# Patient Record
Sex: FEMALE | Race: WHITE
[De-identification: names, ages, dates, MRNs, and addresses within clinical notes are randomized per-mention and may not be internally consistent; named-entity substitution may affect disease eponyms.]

---

## 2020-07-10 ENCOUNTER — HOSPITAL ENCOUNTER (EMERGENCY)
Dept: HOSPITAL 94 - ER | Age: 30
Discharge: HOME | End: 2020-07-10
Payer: SELF-PAY

## 2020-07-10 VITALS — BODY MASS INDEX: 35.76 KG/M2 | WEIGHT: 209.44 LBS | HEIGHT: 64 IN

## 2020-07-10 VITALS — DIASTOLIC BLOOD PRESSURE: 112 MMHG | SYSTOLIC BLOOD PRESSURE: 166 MMHG

## 2020-07-10 DIAGNOSIS — Z87.440: ICD-10-CM

## 2020-07-10 DIAGNOSIS — R11.0: ICD-10-CM

## 2020-07-10 DIAGNOSIS — R30.0: ICD-10-CM

## 2020-07-10 DIAGNOSIS — N76.0: ICD-10-CM

## 2020-07-10 DIAGNOSIS — N39.0: Primary | ICD-10-CM

## 2020-07-10 DIAGNOSIS — Z79.2: ICD-10-CM

## 2020-07-10 LAB
BACTERIA URNS QL MICRO: (no result) /HPF
CLARITY UR: CLEAR
COLOR UR: YELLOW
DEPRECATED SQUAMOUS URNS QL MICRO: (no result) /LPF
GLUCOSE UR STRIP-MCNC: NEGATIVE MG/DL
HCG UR QL: NEGATIVE
HGB UR QL STRIP: NEGATIVE
KETONES UR STRIP-MCNC: NEGATIVE MG/DL
LEUKOCYTE ESTERASE UR QL STRIP: (no result)
NITRITE UR QL STRIP: NEGATIVE
PH UR STRIP: 7.5 [PH] (ref 4.8–8)
PROT UR QL STRIP: (no result) MG/DL
RBC #/AREA URNS HPF: (no result) /HPF (ref 0–2)
SP GR UR STRIP: 1.02 (ref 1–1.03)
URN COLLECT METHOD CLASS: (no result)
UROBILINOGEN UR STRIP-MCNC: 2 E.U/DL (ref 0.2–1)
WBC #/AREA URNS HPF: (no result) /HPF (ref 0–4)

## 2020-07-10 PROCEDURE — 81001 URINALYSIS AUTO W/SCOPE: CPT

## 2020-07-10 PROCEDURE — 87186 SC STD MICRODIL/AGAR DIL: CPT

## 2020-07-10 PROCEDURE — 99283 EMERGENCY DEPT VISIT LOW MDM: CPT

## 2020-07-10 PROCEDURE — 81025 URINE PREGNANCY TEST: CPT

## 2020-07-10 PROCEDURE — 87088 URINE BACTERIA CULTURE: CPT

## 2020-07-10 PROCEDURE — 87077 CULTURE AEROBIC IDENTIFY: CPT

## 2020-07-10 NOTE — NUR
SHE RECEIVED MONISTAT LAST NIGHT BUT DIDN'T PLACE ANY CREAM BECAUSE SHE HAS 
NEVER USED THE MEDICATION BEFORE AND WASN'T EXACTLY SURE HOW TO USE THE 
MEDICATION AND ALSO FELT UNCOMFORTABLE DOING THAT.

## 2020-09-01 ENCOUNTER — HOSPITAL ENCOUNTER (EMERGENCY)
Dept: HOSPITAL 94 - ER | Age: 30
Discharge: HOME | End: 2020-09-01
Payer: MEDICAID

## 2020-09-01 VITALS — DIASTOLIC BLOOD PRESSURE: 99 MMHG | SYSTOLIC BLOOD PRESSURE: 145 MMHG

## 2020-09-01 VITALS — WEIGHT: 222.67 LBS | HEIGHT: 64 IN | BODY MASS INDEX: 38.01 KG/M2

## 2020-09-01 DIAGNOSIS — K08.89: Primary | ICD-10-CM

## 2020-09-01 DIAGNOSIS — Z79.2: ICD-10-CM

## 2020-09-01 DIAGNOSIS — Z87.440: ICD-10-CM

## 2020-09-01 PROCEDURE — 99283 EMERGENCY DEPT VISIT LOW MDM: CPT

## 2021-03-29 ENCOUNTER — HOSPITAL ENCOUNTER (EMERGENCY)
Dept: HOSPITAL 94 - ER | Age: 31
Discharge: HOME | End: 2021-03-29
Payer: MEDICAID

## 2021-03-29 VITALS — BODY MASS INDEX: 38.01 KG/M2 | HEIGHT: 64 IN | WEIGHT: 222.67 LBS

## 2021-03-29 VITALS — DIASTOLIC BLOOD PRESSURE: 87 MMHG | SYSTOLIC BLOOD PRESSURE: 133 MMHG

## 2021-03-29 DIAGNOSIS — J02.9: Primary | ICD-10-CM

## 2021-03-29 DIAGNOSIS — Z20.822: ICD-10-CM

## 2021-03-29 DIAGNOSIS — Z79.899: ICD-10-CM

## 2021-03-29 DIAGNOSIS — Z87.440: ICD-10-CM

## 2021-03-29 PROCEDURE — 87635 SARS-COV-2 COVID-19 AMP PRB: CPT

## 2021-03-29 PROCEDURE — 99283 EMERGENCY DEPT VISIT LOW MDM: CPT

## 2025-02-13 ENCOUNTER — HOSPITAL ENCOUNTER (EMERGENCY)
Dept: HOSPITAL 94 - ER | Age: 35
Discharge: HOME | End: 2025-02-13
Payer: MEDICAID

## 2025-02-13 VITALS
OXYGEN SATURATION: 98 % | RESPIRATION RATE: 18 BRPM | SYSTOLIC BLOOD PRESSURE: 147 MMHG | HEART RATE: 79 BPM | DIASTOLIC BLOOD PRESSURE: 87 MMHG

## 2025-02-13 VITALS — HEIGHT: 64 IN | BODY MASS INDEX: 43.11 KG/M2 | WEIGHT: 252.54 LBS

## 2025-02-13 VITALS — TEMPERATURE: 97.6 F

## 2025-02-13 DIAGNOSIS — Z79.899: ICD-10-CM

## 2025-02-13 DIAGNOSIS — J00: ICD-10-CM

## 2025-02-13 DIAGNOSIS — R05.9: ICD-10-CM

## 2025-02-13 DIAGNOSIS — R09.81: Primary | ICD-10-CM

## 2025-02-13 PROCEDURE — 99282 EMERGENCY DEPT VISIT SF MDM: CPT
